# Patient Record
Sex: MALE | Race: WHITE | Employment: FULL TIME | URBAN - METROPOLITAN AREA
[De-identification: names, ages, dates, MRNs, and addresses within clinical notes are randomized per-mention and may not be internally consistent; named-entity substitution may affect disease eponyms.]

---

## 2019-09-09 ENCOUNTER — HOSPITAL ENCOUNTER (EMERGENCY)
Facility: CLINIC | Age: 59
Discharge: HOME | End: 2019-09-09
Attending: NURSE PRACTITIONER | Admitting: NURSE PRACTITIONER
Payer: COMMERCIAL

## 2019-09-09 VITALS
DIASTOLIC BLOOD PRESSURE: 76 MMHG | OXYGEN SATURATION: 97 % | HEART RATE: 79 BPM | SYSTOLIC BLOOD PRESSURE: 120 MMHG | TEMPERATURE: 98.4 F

## 2019-09-09 DIAGNOSIS — S00.252A FOREIGN BODY OF LEFT EYELID: ICD-10-CM

## 2019-09-09 DIAGNOSIS — H10.9 CONJUNCTIVITIS: ICD-10-CM

## 2019-09-09 PROCEDURE — G0463 HOSPITAL OUTPT CLINIC VISIT: HCPCS

## 2019-09-09 PROCEDURE — 99213 OFFICE O/P EST LOW 20 MIN: CPT | Mod: Z6 | Performed by: NURSE PRACTITIONER

## 2019-09-09 RX ORDER — ERYTHROMYCIN 5 MG/G
0.5 OINTMENT OPHTHALMIC 4 TIMES DAILY
Qty: 3.5 G | Refills: 0 | Status: SHIPPED | OUTPATIENT
Start: 2019-09-09

## 2019-09-09 RX ORDER — TETRACAINE HYDROCHLORIDE 5 MG/ML
1-2 SOLUTION OPHTHALMIC ONCE
Status: DISCONTINUED | OUTPATIENT
Start: 2019-09-09 | End: 2019-09-09 | Stop reason: HOSPADM

## 2019-09-09 ASSESSMENT — ENCOUNTER SYMPTOMS
FEVER: 0
CONFUSION: 0
DYSURIA: 0
DIARRHEA: 0
ABDOMINAL PAIN: 0
COUGH: 0
WOUND: 0
EYE DISCHARGE: 1
NAUSEA: 0
CONSTIPATION: 0
CHILLS: 0
EYE PAIN: 1
WHEEZING: 0
SPEECH DIFFICULTY: 0
PHOTOPHOBIA: 0
SORE THROAT: 0
SHORTNESS OF BREATH: 0
HEMATURIA: 0
VOMITING: 0
DIAPHORESIS: 0
DIFFICULTY URINATING: 0

## 2019-09-09 NOTE — ED PROVIDER NOTES
History     Chief Complaint   Patient presents with     Eye Drainage     poss FB in lt eye and still bothering him after irrigation attempt     HPI  Jose Corea is a 59 year old male who presents with concern of dry scratchy, iiritated eye with drainage. Pt states feels like headache behind the eyeball. Pt states it started with a foreign body in the eye.  Pt states it could have been metal, or dust, or dirt.  Pt rates pain 01/10.  Pt reports he flushed it and it was irritated for a few days but then it has been persistent and slowly got better.  Pt states it never healed completely.  Pt reports that this week it has worsened.  Pt states the eyelid itself seems more red and puffy.  Pt states there is vision changes over the past couple of days.   Pt denies hx of glaucoma, macular degeneration.     Allergies:  No Known Allergies    Problem List:    Patient Active Problem List    Diagnosis Date Noted     CARDIOVASCULAR SCREENING; LDL GOAL LESS THAN 130 2014     Priority: Medium     SMOKING (TOBACCO DEPENDENCE) 2006     Priority: Medium        Past Medical History:    Past Medical History:   Diagnosis Date     Lesion of plantar nerve      Other malignant neoplasm of skin, site unspecified        Past Surgical History:    Past Surgical History:   Procedure Laterality Date     COLONOSCOPY       SURGICAL HISTORY OF -       left zygoma fracture     SURGICAL HISTORY OF -       left subacromial decompression with partial distal claviculectomy       Family History:    Family History   Problem Relation Age of Onset     Cancer - colorectal Father          age 57, may also have had CAD     Blood Disease Mother          in 70s of leukemia     Cancer Paternal Grandfather        Social History:  Marital Status:   [2]  Social History     Tobacco Use     Smoking status: Current Every Day Smoker     Packs/day: 0.50     Years: 30.00     Pack years: 15.00     Types: Cigarettes    Substance Use Topics     Alcohol use: Yes     Comment: occassionally     Drug use: No        Medications:      erythromycin (ROMYCIN) 5 MG/GM ophthalmic ointment   NO ACTIVE MEDICATIONS   varenicline (CHANTIX STARTING MONTH PAK) 0.5 MG X 11 & 1 MG X 42 tablet   varenicline (CHANTIX) 1 MG tablet     Review of Systems   Constitutional: Negative for chills, diaphoresis and fever.   HENT: Negative for ear pain and sore throat.    Eyes: Positive for pain, discharge and visual disturbance. Negative for photophobia.   Respiratory: Negative for cough, shortness of breath and wheezing.    Cardiovascular: Negative for chest pain.   Gastrointestinal: Negative for abdominal pain, constipation, diarrhea, nausea and vomiting.   Genitourinary: Negative for difficulty urinating, dysuria and hematuria.   Skin: Negative for rash and wound.   Neurological: Negative for speech difficulty.   Psychiatric/Behavioral: Negative for confusion and self-injury.   All other systems reviewed and are negative.      Physical Exam   BP: 120/76  Pulse: 79  Temp: 98.4  F (36.9  C)  SpO2: 97 %      Physical Exam   Constitutional: He is oriented to person, place, and time. He appears well-developed and well-nourished. He is cooperative. No distress.   HENT:   Head: Normocephalic and atraumatic.   Right Ear: Hearing and external ear normal.   Left Ear: Hearing and external ear normal.   Nose: Nose normal.   Mouth/Throat: Oropharynx is clear and moist.   Eyes: Pupils are equal, round, and reactive to light. EOM are normal. Right eye exhibits exudate (yellow green exudate in eyelashes, crusted). Right eye exhibits no discharge. Left eye exhibits exudate. Left eye exhibits no discharge. Foreign body (1 mm white object upper eyelid, removed without difficulty) present in the left eye. Right conjunctiva is injected. Right conjunctiva has no hemorrhage. Left conjunctiva is injected. Left conjunctiva has no hemorrhage. No scleral icterus.   Slit lamp exam:        The right eye shows no corneal abrasion, no corneal flare, no corneal ulcer, no foreign body, no hyphema and no fluorescein uptake.        The left eye shows no corneal abrasion, no corneal flare, no corneal ulcer, no foreign body, no hyphema and no fluorescein uptake.   Cardiovascular: Normal rate, regular rhythm, normal heart sounds and intact distal pulses. Exam reveals no gallop and no friction rub.   No murmur heard.  Pulmonary/Chest: Effort normal and breath sounds normal. No stridor. No respiratory distress. He has no wheezes. He has no rales.   Musculoskeletal: He exhibits no edema or tenderness.   Neurological: He is alert and oriented to person, place, and time.   Skin: Skin is warm. Capillary refill takes less than 2 seconds. No rash noted. He is not diaphoretic.   Psychiatric: He has a normal mood and affect.   Nursing note and vitals reviewed.      ED Course        Procedures      No results found for this or any previous visit (from the past 24 hour(s)).    Medications   tetracaine (PONTOCAINE) 0.5 % ophthalmic solution 1-2 drop (has no administration in time range)       Assessments & Plan (with Medical Decision Making)     I have reviewed the nursing notes.    I have reviewed the findings, diagnosis, plan and need for follow up with the patient.  Patient presents to the urgent care with a foreign body sensation in his left eye with subsequent onset of crusty eye drainage bilaterally and scratchy irritated eyes.  Patient reports foreign body sensation was first noted 2 weeks ago and he irrigated his left eye and thought that it was doing better and reports that over the past week it has gotten worse.  Patient denies any severe eye pain.  Patient just reports a dry scratchy gritty eye sensation.  Other than the irrigation patient has not tried any other treatments.  Patient reports some decreased or blurred vision sensation in the left eye.  Patient denies any fevers, aches, chills, sweats.  On  examination visual exam there is no obvious foreign body the conjunctive is injected bilaterally and there is crusty yellow-green discharge noted in the eyelashes.  Floor seen uptake was negative on bilateral eyes.  The left eyelid was everted and a small 1 mm white foreign body was noted and removed without complication.  Will initiate treatment with erythromycin eye ointment and recommend using utilizing this on both eyes due to the conjunctival injection and drainage and then the foreign body for healing.  Recommend follow-up with eye within 48 hours.  Patient verbalized understanding regarding all of the above and was discharged in stable condition.  Discharge Medication List as of 9/9/2019  6:33 PM      START taking these medications    Details   erythromycin (ROMYCIN) 5 MG/GM ophthalmic ointment Place 0.5 inches into both eyes 4 times dailyDisp-3.5 g, [id number]             Final diagnoses:   Foreign body of left eyelid   Conjunctivitis       9/9/2019   [facility] EMERGENCY DEPARTMENT     Suzanne Dias APRN CNP  09/09/19 [id number]

## 2019-09-09 NOTE — DISCHARGE INSTRUCTIONS
Call [facility] and be re-evaluated within 48 hours [phone]  Start EES ointment and appy 4 times daily for 5 days in both eyes

## 2019-09-09 NOTE — ED AVS SNAPSHOT
[facility] Emergency Department  [address] [facility] [address] [address]  Phone:  [phone]  Fax:  [phone]                                    Jose Corea   MRN: [id number]    Department:  [facility] Emergency Department   Date of Visit:  9/9/2019           After Visit Summary Signature Page    I have received my discharge instructions, and my questions have been answered. I have discussed any challenges I see with this plan with the nurse or doctor.    ..........................................................................................................................................  Patient/Patient Representative Signature      ..........................................................................................................................................  Patient Representative Print Name and Relationship to Patient    ..................................................               ................................................  Date                                   Time    ..........................................................................................................................................  Reviewed by Signature/Title    ...................................................              ..............................................  Date                                               Time          22EPIC Rev 08/18

## 2020-01-17 ENCOUNTER — OFFICE VISIT (OUTPATIENT)
Dept: FAMILY MEDICINE | Facility: CLINIC | Age: 60
End: 2020-01-17
Payer: COMMERCIAL

## 2020-01-17 VITALS
HEART RATE: 67 BPM | BODY MASS INDEX: 21.34 KG/M2 | SYSTOLIC BLOOD PRESSURE: 124 MMHG | OXYGEN SATURATION: 97 % | DIASTOLIC BLOOD PRESSURE: 81 MMHG | HEIGHT: 64 IN | TEMPERATURE: 97.3 F | WEIGHT: 125 LBS | RESPIRATION RATE: 16 BRPM

## 2020-01-17 DIAGNOSIS — J40 BRONCHITIS: ICD-10-CM

## 2020-01-17 DIAGNOSIS — J01.90 ACUTE SINUSITIS WITH SYMPTOMS > 10 DAYS: Primary | ICD-10-CM

## 2020-01-17 PROCEDURE — 99213 OFFICE O/P EST LOW 20 MIN: CPT | Performed by: NURSE PRACTITIONER

## 2020-01-17 RX ORDER — DOXYCYCLINE HYCLATE 100 MG
100 TABLET ORAL 2 TIMES DAILY
Qty: 20 TABLET | Refills: 0 | Status: SHIPPED | OUTPATIENT
Start: 2020-01-17 | End: 2020-01-27

## 2020-01-17 ASSESSMENT — MIFFLIN-ST. JEOR: SCORE: 1288

## 2020-01-17 NOTE — PROGRESS NOTES
"Subjective     Jose Corea is a 59 year old male who presents to clinic today for the following health issues:    HPI   ENT Symptoms             Symptoms: cc Present Absent Comment   Fever/Chills  x     Fatigue x x     Muscle Aches  x     Eye Irritation  x     Sneezing x x     Nasal Avelino/Drg x x  Purulent rhinnorhea, headache   Sinus Pressure/Pain  x     Loss of smell  x     Dental pain  x     Sore Throat  x  Not now    Swollen Glands   x    Ear Pain/Fullness  x     Cough x x  productive   Wheeze x x     Chest Pain x x     Shortness of breath  x     Rash       Other  x  Dizziness      Symptom duration:  x 3 weeks    Symptom severity:  cough will not go away    Treatments tried:  OTC cough meds    Contacts:  work            Patient Active Problem List   Diagnosis     SMOKING (TOBACCO DEPENDENCE)     CARDIOVASCULAR SCREENING; LDL GOAL LESS THAN 130     Past Surgical History:   Procedure Laterality Date     COLONOSCOPY       SURGICAL HISTORY OF -       left zygoma fracture     SURGICAL HISTORY OF -       left subacromial decompression with partial distal claviculectomy       Social History     Tobacco Use     Smoking status: Current Every Day Smoker     Packs/day: 0.50     Years: 30.00     Pack years: 15.00     Types: Cigarettes     Smokeless tobacco: Never Used   Substance Use Topics     Alcohol use: Yes     Comment: occassionally     Family History   Problem Relation Age of Onset     Blood Disease Mother          in 70s of leukemia     Cancer - colorectal Father          age 57, may also have had CAD     Cancer Paternal Grandfather            Reviewed and updated as needed this visit by Provider         Review of Systems   ROS COMP: Constitutional, HEENT, cardiovascular, pulmonary, gi and gu systems are negative, except as otherwise noted.      Objective    /81   Pulse 67   Temp 97.3  F (36.3  C)   Resp 16   Ht 1.626 m (5' 4\")   Wt 56.7 kg (125 lb)   SpO2 97%   BMI 21.46 kg/m  "   Body mass index is 21.46 kg/m .  Physical Exam   GENERAL: healthy, alert and no distress  EYES: Eyes grossly normal to inspection, PERRL and conjunctivae and sclerae normal  HENT: normal cephalic/atraumatic, ear canals and TM's normal, nose and mouth without ulcers or lesions, oropharynx clear, oral mucous membranes moist, sinuses: maxillary, frontal tenderness on both sides and purulent mucus along posterior pharynx.  NECK: no adenopathy, no asymmetry, masses, or scars and thyroid normal to palpation  RESP: rhonchi bilateral  CV: regular rate and rhythm, normal S1 S2, no S3 or S4, no murmur, click or rub, no peripheral edema and peripheral pulses strong  SKIN: no suspicious lesions or rashes  NEURO: Normal strength and tone, mentation intact and speech normal    Diagnostic Test Results:  Labs reviewed in Epic        Assessment & Plan       ICD-10-CM    1. Acute sinusitis with symptoms > 10 days J01.90 doxycycline hyclate (VIBRA-TABS) 100 MG tablet   2. Bronchitis J40 doxycycline hyclate (VIBRA-TABS) 100 MG tablet        Tobacco Cessation:   reports that he has been smoking cigarettes. He has a 15.00 pack-year smoking history. He has never used smokeless tobacco.  Tobacco Cessation Action Plan: Information offered: Patient not interested at this time        FUTURE APPOINTMENTS:       - Follow up in 3-5 days for symptoms that are not improving, sooner for new or worsening sx.     Patient Instructions       Our Clinic hours are:  Mondays    7:20 am - 7 pm  Tues -  Fri  7:20 am - 5 pm    Clinic Phone: [phone]    The clinic lab opens at 7:30 am Mon - Fri and appointments are required.    [facility]. [phone]  Monday  8 am - 7pm  Tues - Fri 8 am - 5:30 pm         Patient Education     Bronchitis, Antibiotic Treatment (Adult)    Bronchitis is an infection of the air passages (bronchial tubes) in your lungs. It often occurs when you have a cold. This illness is contagious during the first  few days and is spread through the air by coughing and sneezing, or by direct contact (touching the sick person and then touching your own eyes, nose, or mouth).  Symptoms of bronchitis include cough with mucus (phlegm) and low-grade fever. Bronchitis usually lasts 7 to 14 days. Mild cases can be treated with simple home remedies. More severe infection is treated with an antibiotic.  Home care  Follow these guidelines when caring for yourself at home:    If your symptoms are severe, rest at home for the first 2 to 3 days. When you go back to your usual activities, don't let yourself get too tired.    Don't smoke. Also stay away from secondhand smoke.    You may use over-the-counter medicines to control fever or pain, unless another medicine was prescribed. If you have chronic liver or kidney disease or have ever had a stomach ulcer or gastrointestinal bleeding, talk with your healthcare provider before using these medicines. Also talk to your provider if you are taking medicine to prevent blood clots. Aspirin should never be given to anyone younger than 18 who is ill with a viral infection or fever. It may cause severe liver or brain damage.    Your appetite may be low, so a light diet is fine. Stay well hydrated by drinking 6 to 8 glasses of fluids per day. This includes water, soft drinks, sports drinks, juices, tea, or soup. Extra fluids will help loosen mucus in your nose and lungs.    Over-the-counter cough, cold, and sore-throat medicines will not shorten the length of the illness, but they may be helpful to reduce your symptoms. Don't use decongestants if you have high blood pressure.    Finish all antibiotic medicine. Do this even if you are feeling better after only a few days.  Follow-up care  Follow up with your healthcare provider, or as advised. If you had an X-ray or ECG (electrocardiogram), a specialist will review it. You will be told of any new test results that may affect your care.  If you are age  65 or older, if you smoke, or if you have a chronic lung disease or condition that affects your immune system, ask your healthcare provider about getting a pneumococcal vaccine and a yearly flu shot (influenza vaccine).  When to seek medical advice  Call your healthcare provider right away if any of these occur:    Fever of 100.4 F (38 C) or higher, or as directed by your healthcare provider    Coughing up more sputum    Weakness, drowsiness, headache, facial pain, ear pain, or a stiff neck  Call 911  Call 911 if any of these occur.    Coughing up blood    Weakness, drowsiness, headache, or stiff neck that get worse    Trouble breathing, wheezing, or pain with breathing  Date Last Reviewed: 6/1/2018 2000-2019 [employer]. [address] [place] [address], [place] [address]. All rights reserved. This information is not intended as a substitute for professional medical care. Always follow your healthcare professional's instructions.           Patient Education     Sinusitis (Antibiotic Treatment)    The sinuses are air-filled spaces within the bones of the face. They connect to the inside of the nose. Sinusitis is an inflammation of the tissue that lines the sinuses. Sinusitis can occur during a cold. It can also happen due to allergies to pollens and other particles in the air. Sinusitis can cause symptoms of sinus congestion and a feeling of fullness. A sinus infection causes fever, headache, and facial pain. There is often green or yellow fluid draining from the nose or into the back of the throat (post-nasal drip). You have been given antibiotics to treat this condition.  Home care    Take the full course of antibiotics as instructed. Do not stop taking them, even when you feel better.    Drink plenty of water, hot tea, and other liquids. This may help thin nasal mucus. It also may help your sinuses drain fluids.    Heat may help soothe painful areas of your face. Use a towel soaked in hot water. Or, stand  in the shower and direct the warm spray onto your face. Using a vaporizer along with a menthol rub at night may also help soothe symptoms.     An expectorant with guaifenesin may help thin nasal mucus and help your sinuses drain fluids.    You can use an over-the-counter decongestant, unless a similar medicine was prescribed to you. Nasal sprays work the fastest. Use one that contains phenylephrine or oxymetazoline. First blow your nose gently. Then use the spray. Do not use these medicines more often than directed on the label. If you do, your symptoms may get worse. You may also take pills that contain pseudoephedrine. Don t use products that combine multiple medicines. This is because side effects may be increased. Read labels. You can also ask the pharmacist for help. (People with high blood pressure should not use decongestants. They can raise blood pressure.)    Over-the-counter antihistamines may help if allergies contributed to your sinusitis.      Do not use nasal rinses or irrigation during an acute sinus infection, unless your healthcare provider tells you to. Rinsing may spread the infection to other areas in your sinuses.    Use acetaminophen or ibuprofen to control pain, unless another pain medicine was prescribed to you. If you have chronic liver or kidney disease or ever had a stomach ulcer, talk with your healthcare provider before using these medicines. (Aspirin should never be taken by anyone under age 18 who is ill with a fever. It may cause severe liver damage.)    Don't smoke. This can make symptoms worse.  Follow-up care  Follow up with your healthcare provider or our staff if you are not better in 1 week.  When to seek medical advice  Call your healthcare provider if any of these occur:    Facial pain or headache that gets worse    Stiff neck    Unusual drowsiness or confusion    Swelling of your forehead or eyelids    Vision problems, such as blurred or double vision    Fever of 100.4 F  (38 C) or higher, or as directed by your healthcare provider    Seizure    Breathing problems    Symptoms don't go away in 10 days  Prevention  Here are steps you can take to help prevent an infection:    Keep good hand washing habits.    Don t have close contact with people who have sore throats, colds, or other upper respiratory infections.    Don t smoke, and stay away from secondhand smoke.    Stay up to date with of your vaccines.  Date Last Reviewed: 11/1/2017 2000-2019 [employer]. [address] [place] [address]. All rights reserved. This information is not intended as a substitute for professional medical care. Always follow your healthcare professional's instructions.               No follow-ups on file.    JOSE GUADALUPE Chau [facility]

## 2020-01-17 NOTE — PATIENT INSTRUCTIONS
Our Clinic hours are:  Mondays    7:20 am - 7 pm  Tues - Fri  7:20 am - 5 pm    Clinic Phone: [phone]    The clinic lab opens at 7:30 am Mon - Fri and appointments are required.    [facility]. [phone]  Monday  8 am - 7pm  Tues - Fri 8 am - 5:30 pm         Patient Education     Bronchitis, Antibiotic Treatment (Adult)    Bronchitis is an infection of the air passages (bronchial tubes) in your lungs. It often occurs when you have a cold. This illness is contagious during the first few days and is spread through the air by coughing and sneezing, or by direct contact (touching the sick person and then touching your own eyes, nose, or mouth).  Symptoms of bronchitis include cough with mucus (phlegm) and low-grade fever. Bronchitis usually lasts 7 to 14 days. Mild cases can be treated with simple home remedies. More severe infection is treated with an antibiotic.  Home care  Follow these guidelines when caring for yourself at home:    If your symptoms are severe, rest at home for the first 2 to 3 days. When you go back to your usual activities, don't let yourself get too tired.    Don't smoke. Also stay away from secondhand smoke.    You may use over-the-counter medicines to control fever or pain, unless another medicine was prescribed. If you have chronic liver or kidney disease or have ever had a stomach ulcer or gastrointestinal bleeding, talk with your healthcare provider before using these medicines. Also talk to your provider if you are taking medicine to prevent blood clots. Aspirin should never be given to anyone younger than 18 who is ill with a viral infection or fever. It may cause severe liver or brain damage.    Your appetite may be low, so a light diet is fine. Stay well hydrated by drinking 6 to 8 glasses of fluids per day. This includes water, soft drinks, sports drinks, juices, tea, or soup. Extra fluids will help loosen mucus in your nose and  lungs.    Over-the-counter cough, cold, and sore-throat medicines will not shorten the length of the illness, but they may be helpful to reduce your symptoms. Don't use decongestants if you have high blood pressure.    Finish all antibiotic medicine. Do this even if you are feeling better after only a few days.  Follow-up care  Follow up with your healthcare provider, or as advised. If you had an X-ray or ECG (electrocardiogram), a specialist will review it. You will be told of any new test results that may affect your care.  If you are age 65 or older, if you smoke, or if you have a chronic lung disease or condition that affects your immune system, ask your healthcare provider about getting a pneumococcal vaccine and a yearly flu shot (influenza vaccine).  When to seek medical advice  Call your healthcare provider right away if any of these occur:    Fever of 100.4 F (38 C) or higher, or as directed by your healthcare provider    Coughing up more sputum    Weakness, drowsiness, headache, facial pain, ear pain, or a stiff neck  Call 911  Call 911 if any of these occur.    Coughing up blood    Weakness, drowsiness, headache, or stiff neck that get worse    Trouble breathing, wheezing, or pain with breathing  Date Last Reviewed: 6/1/2018 2000-2019 [employer]. [address] [place] [address]. All rights reserved. This information is not intended as a substitute for professional medical care. Always follow your healthcare professional's instructions.           Patient Education     Sinusitis (Antibiotic Treatment)    The sinuses are air-filled spaces within the bones of the face. They connect to the inside of the nose. Sinusitis is an inflammation of the tissue that lines the sinuses. Sinusitis can occur during a cold. It can also happen due to allergies to pollens and other particles in the air. Sinusitis can cause symptoms of sinus congestion and a feeling of fullness. A sinus infection  causes fever, headache, and facial pain. There is often green or yellow fluid draining from the nose or into the back of the throat (post-nasal drip). You have been given antibiotics to treat this condition.  Home care    Take the full course of antibiotics as instructed. Do not stop taking them, even when you feel better.    Drink plenty of water, hot tea, and other liquids. This may help thin nasal mucus. It also may help your sinuses drain fluids.    Heat may help soothe painful areas of your face. Use a towel soaked in hot water. Or,  the shower and direct the warm spray onto your face. Using a vaporizer along with a menthol rub at night may also help soothe symptoms.     An expectorant with guaifenesin may help thin nasal mucus and help your sinuses drain fluids.    You can use an over-the-counter decongestant, unless a similar medicine was prescribed to you. Nasal sprays work the fastest. Use one that contains phenylephrine or oxymetazoline. First blow your nose gently. Then use the spray. Do not use these medicines more often than directed on the label. If you do, your symptoms may get worse. You may also take pills that contain pseudoephedrine. Don t use products that combine multiple medicines. This is because side effects may be increased. Read labels. You can also ask the pharmacist for help. (People with high blood pressure should not use decongestants. They can raise blood pressure.)    Over-the-counter antihistamines may help if allergies contributed to your sinusitis.      Do not use nasal rinses or irrigation during an acute sinus infection, unless your healthcare provider tells you to. Rinsing may spread the infection to other areas in your sinuses.    Use acetaminophen or ibuprofen to control pain, unless another pain medicine was prescribed to you. If you have chronic liver or kidney disease or ever had a stomach ulcer, talk with your healthcare provider before using these medicines.  (Aspirin should never be taken by anyone under age 18 who is ill with a fever. It may cause severe liver damage.)    Don't smoke. This can make symptoms worse.  Follow-up care  Follow up with your healthcare provider or our staff if you are not better in 1 week.  When to seek medical advice  Call your healthcare provider if any of these occur:    Facial pain or headache that gets worse    Stiff neck    Unusual drowsiness or confusion    Swelling of your forehead or eyelids    Vision problems, such as blurred or double vision    Fever of 100.4 F (38 C) or higher, or as directed by your healthcare provider    Seizure    Breathing problems    Symptoms don't go away in 10 days  Prevention  Here are steps you can take to help prevent an infection:    Keep good hand washing habits.    Don t have close contact with people who have sore throats, colds, or other upper respiratory infections.    Don t smoke, and stay away from secondhand smoke.    Stay up to date with of your vaccines.  Date Last Reviewed: 11/1/2017 2000-2019 [employer]. [address] [place] [address], [place] [address]. All rights reserved. This information is not intended as a substitute for professional medical care. Always follow your healthcare professional's instructions.

## 2020-08-24 ENCOUNTER — TELEPHONE (OUTPATIENT)
Dept: FAMILY MEDICINE | Facility: CLINIC | Age: 60
End: 2020-08-24

## 2020-08-24 DIAGNOSIS — Z12.11 SPECIAL SCREENING FOR MALIGNANT NEOPLASMS, COLON: Primary | ICD-10-CM

## 2020-08-24 NOTE — TELEPHONE ENCOUNTER
Panel Management Review      Patient has the following on his problem list: None      Composite cancer screening  Chart review shows that this patient is due/due soon for the following Colonoscopy and Fecal Colorectal (FIT)  Summary:    Patient is due/failing the following:   COLONOSCOPY and FIT    Action needed:   Patient needs referral/order: colonoscopy and fit     Type of outreach:    Phone, left message for patient to call back.     Questions for provider review:    None                                                                                                                                    Juani Calderon MA       Chart routed to Care Team .

## 2020-11-03 DIAGNOSIS — Z12.11 SPECIAL SCREENING FOR MALIGNANT NEOPLASMS, COLON: ICD-10-CM

## 2020-11-03 PROCEDURE — 82274 ASSAY TEST FOR BLOOD FECAL: CPT

## 2020-11-06 LAB — HEMOCCULT STL QL IA: NEGATIVE

## 2025-05-31 ENCOUNTER — HOSPITAL ENCOUNTER (EMERGENCY)
Facility: CLINIC | Age: 65
Discharge: HOME | End: 2025-05-31
Attending: NURSE PRACTITIONER | Admitting: NURSE PRACTITIONER
Payer: COMMERCIAL

## 2025-05-31 VITALS
OXYGEN SATURATION: 96 % | SYSTOLIC BLOOD PRESSURE: 170 MMHG | HEART RATE: 86 BPM | RESPIRATION RATE: 16 BRPM | TEMPERATURE: 97.8 F | DIASTOLIC BLOOD PRESSURE: 84 MMHG

## 2025-05-31 DIAGNOSIS — S71.112A LACERATION OF LEFT THIGH, INITIAL ENCOUNTER: ICD-10-CM

## 2025-05-31 PROCEDURE — 12002 RPR S/N/AX/GEN/TRNK2.6-7.5CM: CPT | Performed by: NURSE PRACTITIONER

## 2025-05-31 PROCEDURE — G0463 HOSPITAL OUTPT CLINIC VISIT: HCPCS | Mod: 25

## 2025-05-31 PROCEDURE — 99213 OFFICE O/P EST LOW 20 MIN: CPT | Mod: 25 | Performed by: NURSE PRACTITIONER

## 2025-05-31 PROCEDURE — 12002 RPR S/N/AX/GEN/TRNK2.6-7.5CM: CPT

## 2025-05-31 ASSESSMENT — COLUMBIA-SUICIDE SEVERITY RATING SCALE - C-SSRS
6. HAVE YOU EVER DONE ANYTHING, STARTED TO DO ANYTHING, OR PREPARED TO DO ANYTHING TO END YOUR LIFE?: NO
2. HAVE YOU ACTUALLY HAD ANY THOUGHTS OF KILLING YOURSELF IN THE PAST MONTH?: NO
1. IN THE PAST MONTH, HAVE YOU WISHED YOU WERE DEAD OR WISHED YOU COULD GO TO SLEEP AND NOT WAKE UP?: NO

## 2025-05-31 ASSESSMENT — ACTIVITIES OF DAILY LIVING (ADL): ADLS_ACUITY_SCORE: 41

## 2025-05-31 NOTE — DISCHARGE INSTRUCTIONS
16 stitches should be removed in 10 days.  Watch for signs and symptoms of infection including increased redness increased swelling, pus colored drainage fever chills if any of these begin recommend that you are reevaluated for infection.  Return here if you choose to have stitches removed.

## 2025-05-31 NOTE — ED PROVIDER NOTES
ED Provider Note  [facility]      History     Chief Complaint   Patient presents with    Laceration     LT leg     HPI  Jose Corea is a 64 year old male who presents with a left thigh laceration from a chainsaw.  Patient denies any diabetes or delayed wound healing.  Reports that his tetanus was given last year.  Patient denies any loss or change of sensation in his leg has full range of motion of his leg.  He reports that he washed this out before arrival.           Allergies:  No Known Allergies    Problem List:    Patient Active Problem List    Diagnosis Date Noted    CARDIOVASCULAR SCREENING; LDL GOAL LESS THAN 130 02/05/2014     Priority: Medium    SMOKING (TOBACCO DEPENDENCE) 12/30/2006     Priority: Medium        Past Medical History:    Past Medical History:   Diagnosis Date    Lesion of plantar nerve     Other malignant neoplasm of skin, site unspecified 2004       Past Surgical History:    Past Surgical History:   Procedure Laterality Date    COLONOSCOPY  2003    SURGICAL HISTORY OF -   1975    left zygoma fracture    SURGICAL HISTORY OF -   2001    left subacromial decompression with partial distal claviculectomy       Social History:  Marital Status:   [2]  Social History     Tobacco Use    Smoking status: Every Day     Current packs/day: 0.50     Average packs/day: 0.5 packs/day for 30.0 years (15.0 ttl pk-yrs)     Types: Cigarettes    Smokeless tobacco: Never   Substance Use Topics    Alcohol use: Yes     Comment: occassionally    Drug use: No        Medications:    erythromycin (ROMYCIN) 5 MG/GM ophthalmic ointment  NO ACTIVE MEDICATIONS  varenicline (CHANTIX STARTING MONTH PAK) 0.5 MG X 11 & 1 MG X 42 tablet  varenicline (CHANTIX) 1 MG tablet          Review of Systems  A medically appropriate review of systems was performed with pertinent positives and negatives noted in the HPI, and all other systems negative.    Physical Exam   Patient Vitals for the past  24 hrs:   BP Temp Pulse Resp SpO2   05/31/25 1705 (!) 170/84 97.8  F (36.6  C) 86 16 96 %          Physical Exam  General: alert and in no acute distress on arrival  Head: atraumatic, normocephalic  Lungs:  nonlabored  CV: Regular rate and rhythm, extremities warm and perfused  Skin: 8 cm laceration to his left thigh, no rashes, no diaphoresis and skin color normal  Neuro: Patient awake, alert, speech is fluent, no focal deficits  Psychiatric: affect/mood normal, appropriate historian      ED Course                 [facility]    -Laceration Repair    Date/Time: 5/31/2025 5:31 PM    Performed by: Maria Elena Morfin APRN CNP  Authorized by: Maria Elena Morfin APRN CNP    Risks, benefits and alternatives discussed.      ANESTHESIA (see MAR for exact dosages):     Anesthesia method:  Local infiltration    Local anesthetic:  Bupivacaine 0.25% WITH epi  LACERATION DETAILS     Location:  Leg    Leg location:  L upper leg    Length (cm):  8    Depth (mm):  2.5    REPAIR TYPE:     Repair type:  Simple    EXPLORATION:     Hemostasis achieved with:  Direct pressure and epinephrine    Wound exploration: wound explored through full range of motion and entire depth of wound probed and visualized      Wound extent: areolar tissue not violated, fascia not violated, no foreign body, no signs of injury, no nerve damage, no tendon damage, no underlying fracture and no vascular damage      Contaminated: no      TREATMENT:     Area cleansed with:  Latosha    Amount of cleaning:  Standard    Irrigation solution:  Tap water    Irrigation method:  Tap    Visualized foreign bodies/material removed: no      SKIN REPAIR     Repair method:  Sutures    Suture size:  4-0    Suture material:  Nylon    Suture technique:  Simple interrupted and running locked    Number of sutures:  16    APPROXIMATION     Approximation:  Close    POST-PROCEDURE DETAILS     Dressing:  Antibiotic ointment, non-adherent dressing and sterile  dressing      PROCEDURE    Patient Tolerance:  Patient tolerated the procedure well with no immediate complications                    No results found for this or any previous visit (from the past 48 hours).    MEDICATIONS GIVEN IN THE EMERGENCY DEPARTMENT:  Medications - No data to display             Assessments & Plan (with Medical Decision Making)  64 year old male who presents to the Urgent Care for evaluation of laceration from chainsaw to his left thigh.  Held direct pressure on this area for the bleeding to stop.  Patient denies any loss or change of sensation in his thigh, upper or lower leg.  Patient has no health conditions concerning at this time such as diabetes, or delayed wound healing.  No foreign body seen in the wound.  See procedure note for repair.  Patient had washed his wound before arrival and this was washed again with Shur-Clens here entire wound was viewed and no foreign bodies are present.  Reviewed signs and symptoms of infection with patient and advised to return if they do these begin.  Recommend 16 sutures to be removed in 10 days.  Patient discharged home in stable condition.      Patient verbalized agreement with and understanding of the rational for the diagnosis and treatment plan.  All questions were answered to best of my ability and the patient's satisfaction. The patient was advised to contact or return to their primary clinic or UC/ED with any questions that may arise after this visit.       I have reviewed the nursing notes.    I have reviewed the findings, diagnosis, plan and need for follow up with the patient.        NEW PRESCRIPTIONS STARTED AT TODAY'S ER VISIT  New Prescriptions    No medications on file       Final diagnoses:   Laceration of left thigh, initial encounter       5/31/2025   [facility] EMERGENCY DEPT    Disclaimer: This note consists of symbols derived from keyboarding, dictation, and/or voice recognition software. As a result, there may be  errors in the script that have gone undetected.  Please consider this when interpreting information found in the chart.      Maria Elena Morfin, JOSE GUADALUPE CNP  06/04/25 1413